# Patient Record
Sex: MALE | Race: BLACK OR AFRICAN AMERICAN
[De-identification: names, ages, dates, MRNs, and addresses within clinical notes are randomized per-mention and may not be internally consistent; named-entity substitution may affect disease eponyms.]

---

## 2020-06-16 ENCOUNTER — HOSPITAL ENCOUNTER (EMERGENCY)
Dept: HOSPITAL 26 - MED | Age: 30
Discharge: HOME | End: 2020-06-16
Payer: MEDICAID

## 2020-06-16 VITALS — HEIGHT: 70 IN | WEIGHT: 148 LBS | BODY MASS INDEX: 21.19 KG/M2

## 2020-06-16 VITALS — SYSTOLIC BLOOD PRESSURE: 109 MMHG | DIASTOLIC BLOOD PRESSURE: 69 MMHG

## 2020-06-16 VITALS — DIASTOLIC BLOOD PRESSURE: 69 MMHG | SYSTOLIC BLOOD PRESSURE: 109 MMHG

## 2020-06-16 DIAGNOSIS — R42: Primary | ICD-10-CM

## 2020-06-16 DIAGNOSIS — F12.90: ICD-10-CM

## 2020-06-16 DIAGNOSIS — F17.210: ICD-10-CM

## 2020-06-16 DIAGNOSIS — Z72.0: ICD-10-CM

## 2020-06-24 ENCOUNTER — HOSPITAL ENCOUNTER (EMERGENCY)
Dept: HOSPITAL 26 - MED | Age: 30
Discharge: HOME | End: 2020-06-24
Payer: MEDICAID

## 2020-06-24 VITALS — DIASTOLIC BLOOD PRESSURE: 57 MMHG | SYSTOLIC BLOOD PRESSURE: 114 MMHG

## 2020-06-24 VITALS — SYSTOLIC BLOOD PRESSURE: 114 MMHG | DIASTOLIC BLOOD PRESSURE: 57 MMHG

## 2020-06-24 VITALS — WEIGHT: 147 LBS | HEIGHT: 70 IN | BODY MASS INDEX: 21.05 KG/M2
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
## 2020-06-24 DIAGNOSIS — S93.402A: Primary | ICD-10-CM
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
## 2020-06-24 DIAGNOSIS — F17.210: ICD-10-CM

## 2021-03-11 ENCOUNTER — HOSPITAL ENCOUNTER (EMERGENCY)
Dept: HOSPITAL 26 - MED | Age: 31
Discharge: HOME | End: 2021-03-11
Payer: SELF-PAY

## 2021-03-11 VITALS — SYSTOLIC BLOOD PRESSURE: 112 MMHG | DIASTOLIC BLOOD PRESSURE: 61 MMHG

## 2021-03-11 VITALS — WEIGHT: 152 LBS | HEIGHT: 70 IN | BODY MASS INDEX: 21.76 KG/M2
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
## 2021-03-11 DIAGNOSIS — S83.004A: Primary | ICD-10-CM

## 2021-03-11 NOTE — NUR
PLACED IMMOBILIZER ON PT'S RIGHT KNEE AND CHECKED PMSC'S BEFORE AND AFTER 
WIHTOUT INCIDENT. PROVIDED PT WITH CRUTCHES AND GAVE ONE ON ONE INSTRUCTIONS ON 
HOW TO USE THEM WITHOUT INCIDENT.

## 2021-03-11 NOTE — NUR
29 Y/O MALE C/O RT KNEE PAIN X2DAYS. PT STATES HIS RT KNEE "POPPED OUT OF 
PLACE." PT STATES 3/10 SHARP PAIN. NO RECENT FALL. NO SWELLING OR DEFORMITY 
NOTED. REQUESTING FOR MEDICAL CLEARANCE FOR WORK. 



MDEHX: SANDY CHRISTIAN

## 2021-03-23 ENCOUNTER — HOSPITAL ENCOUNTER (EMERGENCY)
Dept: HOSPITAL 26 - MED | Age: 31
Discharge: HOME | End: 2021-03-23
Payer: SELF-PAY

## 2021-03-23 VITALS — DIASTOLIC BLOOD PRESSURE: 58 MMHG | SYSTOLIC BLOOD PRESSURE: 119 MMHG

## 2021-03-23 VITALS — HEIGHT: 71 IN | BODY MASS INDEX: 21.7 KG/M2 | WEIGHT: 155 LBS

## 2021-03-23 DIAGNOSIS — M25.561: Primary | ICD-10-CM
